# Patient Record
Sex: MALE | Race: WHITE | NOT HISPANIC OR LATINO | URBAN - METROPOLITAN AREA
[De-identification: names, ages, dates, MRNs, and addresses within clinical notes are randomized per-mention and may not be internally consistent; named-entity substitution may affect disease eponyms.]

---

## 2017-09-20 ENCOUNTER — IMPORTED ENCOUNTER (OUTPATIENT)
Dept: URBAN - METROPOLITAN AREA CLINIC 43 | Facility: CLINIC | Age: 72
End: 2017-09-20

## 2017-09-20 PROBLEM — H16.223: Noted: 2017-09-20

## 2017-09-20 PROBLEM — H43.22: Noted: 2017-09-20

## 2017-09-20 PROBLEM — H25.13: Noted: 2017-09-20

## 2017-09-20 PROBLEM — H43.812: Noted: 2017-09-20

## 2017-11-09 ENCOUNTER — IMPORTED ENCOUNTER (OUTPATIENT)
Dept: URBAN - METROPOLITAN AREA CLINIC 43 | Facility: CLINIC | Age: 72
End: 2017-11-09

## 2017-11-09 PROBLEM — H25.13: Noted: 2017-11-09

## 2019-02-27 ENCOUNTER — IMPORTED ENCOUNTER (OUTPATIENT)
Dept: URBAN - METROPOLITAN AREA CLINIC 43 | Facility: CLINIC | Age: 74
End: 2019-02-27

## 2019-02-27 PROBLEM — H25.13: Noted: 2019-02-27

## 2019-02-27 PROBLEM — H35.363: Noted: 2019-02-27

## 2019-02-27 PROBLEM — H25.013: Noted: 2019-02-27

## 2019-02-27 PROBLEM — H43.22: Noted: 2019-02-27

## 2020-04-19 ASSESSMENT — KERATOMETRY
OS_K1POWER_DIOPTERS: 43
OD_K1POWER_DIOPTERS: 43
OS_K1POWER_DIOPTERS: 43.5
OD_AXISANGLE_DEGREES: 180
OD_K1POWER_DIOPTERS: 42.75
OS_AXISANGLE_DEGREES: 170
OS_K2POWER_DIOPTERS: 42.75
OD_K2POWER_DIOPTERS: 42.75
OS_K2POWER_DIOPTERS: 42.75
OD_AXISANGLE_DEGREES: 180
OS_AXISANGLE2_DEGREES: 90
OD_AXISANGLE2_DEGREES: 90
OD_AXISANGLE2_DEGREES: 90
OD_K2POWER_DIOPTERS: 42.75
OS_AXISANGLE2_DEGREES: 80
OS_AXISANGLE_DEGREES: 180

## 2020-04-19 ASSESSMENT — VISUAL ACUITY
OD_CC: J2
OD_PH: 20/25-1
OS_OTHER: 20/60.
OD_OTHER: 20/70-.
OS_CC: 20/20-1
OD_CC: 20/25
OS_CC: 20/25 +2
OD_SC: 20/60
OD_SC: 20/60-1
OS_SC: 20/60 +2
OS_CC: J1
OD_OTHER: 20/300.
OD_CC: 20/25
OS_OTHER: <20/400.
OD_OTHER: 20/70.
OD_SC: J16-1
OS_SC: 20/60-2
OS_SC: J10-1
OS_OTHER: 20/50-.
OS_SC: 20/60-1
OD_SC: 20/70

## 2020-04-19 ASSESSMENT — TONOMETRY
OD_IOP_MMHG: 11.0
OD_IOP_MMHG: 12.0
OS_IOP_MMHG: 12.0
OD_IOP_MMHG: 12.0
OS_IOP_MMHG: 11.0
OS_IOP_MMHG: 12.0

## 2021-04-16 ENCOUNTER — LAB OUTSIDE AN ENCOUNTER (OUTPATIENT)
Dept: URBAN - METROPOLITAN AREA CLINIC 68 | Facility: CLINIC | Age: 76
End: 2021-04-16

## 2021-04-19 ENCOUNTER — LAB OUTSIDE AN ENCOUNTER (OUTPATIENT)
Dept: URBAN - METROPOLITAN AREA CLINIC 68 | Facility: CLINIC | Age: 76
End: 2021-04-19

## 2021-04-21 NOTE — PATIENT DISCUSSION
Monitor for changes. Advised Pt of condition of cataract and option of CE to improve visual function once becomes more bothersome. Discussed symptoms of worsening and becoming more bothersome. Pt to call if vision changes or increased symptoms.

## 2021-04-21 NOTE — PATIENT DISCUSSION
Monitor for changes. Advised Pt of condition and option of Yag Cap once becomes more bothered. Discussed symptoms of worsening and becoming more bothersome. Pt to call if vision changes or increased symptoms.

## 2021-04-26 ENCOUNTER — TELEPHONE ENCOUNTER (OUTPATIENT)
Dept: URBAN - METROPOLITAN AREA CLINIC 68 | Facility: CLINIC | Age: 76
End: 2021-04-26

## 2021-04-27 ENCOUNTER — OFFICE VISIT (OUTPATIENT)
Dept: URBAN - METROPOLITAN AREA CLINIC 68 | Facility: CLINIC | Age: 76
End: 2021-04-27

## 2021-04-27 ENCOUNTER — TELEPHONE ENCOUNTER (OUTPATIENT)
Dept: URBAN - METROPOLITAN AREA CLINIC 68 | Facility: CLINIC | Age: 76
End: 2021-04-27

## 2021-05-17 ENCOUNTER — OFFICE VISIT (OUTPATIENT)
Dept: URBAN - METROPOLITAN AREA CLINIC 68 | Facility: CLINIC | Age: 76
End: 2021-05-17

## 2022-06-04 ENCOUNTER — TELEPHONE ENCOUNTER (OUTPATIENT)
Dept: URBAN - METROPOLITAN AREA CLINIC 68 | Facility: CLINIC | Age: 77
End: 2022-06-04

## 2022-06-04 RX ORDER — POLYETHYLENE GLYOCOL 3350, SODIUM CHLORIDE, SODIUM BICARBONATE AND POTASSIUM CHLORIDE 420; 11.2; 5.72; 1.48 G/4L; G/4L; G/4L; G/4L
POWDER, FOR SOLUTION NASOGASTRIC; ORAL AS DIRECTED
Qty: 1 | Refills: 0 | OUTPATIENT
Start: 2020-05-12 | End: 2020-05-13

## 2022-06-05 ENCOUNTER — TELEPHONE ENCOUNTER (OUTPATIENT)
Dept: URBAN - METROPOLITAN AREA CLINIC 68 | Facility: CLINIC | Age: 77
End: 2022-06-05

## 2022-06-05 RX ORDER — LOSARTAN POTASSIUM 25 MG/1
LOSARTAN POTASSIUM( 25MG ORAL 1 DAILY ) ACTIVE -HX ENTRY TABLET ORAL DAILY
Status: ACTIVE | COMMUNITY
Start: 2021-04-27

## 2022-06-05 RX ORDER — PREGABALIN 150 MG/1
PREGABALIN( 150MG ORAL 1 DAILY ) ACTIVE -HX ENTRY CAPSULE ORAL DAILY
Status: ACTIVE | COMMUNITY
Start: 2021-04-27

## 2022-06-05 RX ORDER — GABAPENTIN 300 MG/1
GABAPENTIN( 300MG ORAL 1 DAILY AS NEEDED ) ACTIVE -HX ENTRY CAPSULE ORAL
Status: ACTIVE | COMMUNITY
Start: 2021-04-27

## 2022-06-05 RX ORDER — ROSUVASTATIN CALCIUM 20 MG/1
ROSUVASTATIN CALCIUM( 20MG ORAL 1 DAILY ) ACTIVE -HX ENTRY TABLET, FILM COATED ORAL DAILY
Status: ACTIVE | COMMUNITY
Start: 2021-04-27

## 2022-06-05 RX ORDER — APIXABAN 5 MG/1
ELIQUIS( 5MG ORAL 1 TWICE A DAY ) ACTIVE -HX ENTRY TABLET, FILM COATED ORAL TWICE A DAY
Status: ACTIVE | COMMUNITY
Start: 2021-04-27

## 2022-06-05 RX ORDER — ASPIRIN 325 MG/1
ASPIRIN( 325MG ORAL 1 DAILY ) ACTIVE -HX ENTRY TABLET ORAL DAILY
Status: ACTIVE | COMMUNITY
Start: 2021-04-27

## 2022-06-05 RX ORDER — CARVEDILOL 25 MG/1
CARVEDILOL( 25MG ORAL 1 TWICE A DAY ) ACTIVE -HX ENTRY TABLET, FILM COATED ORAL TWICE A DAY
Status: ACTIVE | COMMUNITY
Start: 2021-04-27

## 2022-06-05 RX ORDER — VARDENAFIL HYDROCHLORIDE 20 MG/1
VARDENAFIL HCL( 20MG ORAL 1 DAILY ) ACTIVE -HX ENTRY TABLET, FILM COATED ORAL DAILY
Status: ACTIVE | COMMUNITY
Start: 2021-04-27

## 2022-06-25 ENCOUNTER — TELEPHONE ENCOUNTER (OUTPATIENT)
Age: 77
End: 2022-06-25

## 2022-06-26 ENCOUNTER — TELEPHONE ENCOUNTER (OUTPATIENT)
Age: 77
End: 2022-06-26

## 2022-06-26 RX ORDER — LOSARTAN POTASSIUM 25 MG/1
LOSARTAN POTASSIUM( 25MG ORAL 1 DAILY ) ACTIVE -HX ENTRY TABLET, FILM COATED ORAL DAILY
Status: ACTIVE | COMMUNITY
Start: 2021-04-27

## 2022-06-26 RX ORDER — CARVEDILOL 25 MG/1
CARVEDILOL( 25MG ORAL 1 TWICE A DAY ) ACTIVE -HX ENTRY TABLET, FILM COATED ORAL TWICE A DAY
Status: ACTIVE | COMMUNITY
Start: 2021-04-27

## 2022-06-26 RX ORDER — VARDENAFIL HYDROCHLORIDE 20 MG/1
VARDENAFIL HCL( 20MG ORAL 1 DAILY ) ACTIVE -HX ENTRY TABLET, FILM COATED ORAL DAILY
Status: ACTIVE | COMMUNITY
Start: 2021-04-27

## 2022-06-26 RX ORDER — ROSUVASTATIN CALCIUM 20 MG/1
ROSUVASTATIN CALCIUM( 20MG ORAL 1 DAILY ) ACTIVE -HX ENTRY TABLET, FILM COATED ORAL DAILY
Status: ACTIVE | COMMUNITY
Start: 2021-04-27

## 2022-06-26 RX ORDER — APIXABAN 5 MG/1
ELIQUIS( 5MG ORAL 1 TWICE A DAY ) ACTIVE -HX ENTRY TABLET, FILM COATED ORAL TWICE A DAY
Status: ACTIVE | COMMUNITY
Start: 2021-04-27

## 2022-06-26 RX ORDER — GABAPENTIN 300 MG/1
GABAPENTIN( 300MG ORAL 1 DAILY AS NEEDED ) ACTIVE -HX ENTRY CAPSULE ORAL
Status: ACTIVE | COMMUNITY
Start: 2021-04-27

## 2022-06-26 RX ORDER — ASPIRIN 325 MG/1
ASPIRIN( 325MG ORAL 1 DAILY ) ACTIVE -HX ENTRY TABLET ORAL DAILY
Status: ACTIVE | COMMUNITY
Start: 2021-04-27

## 2022-06-26 RX ORDER — PREGABALIN 150 MG/1
PREGABALIN( 150MG ORAL 1 DAILY ) ACTIVE -HX ENTRY CAPSULE ORAL DAILY
Status: ACTIVE | COMMUNITY
Start: 2021-04-27

## 2024-07-11 ENCOUNTER — NEW PATIENT (OUTPATIENT)
Dept: URBAN - METROPOLITAN AREA CLINIC 32 | Facility: CLINIC | Age: 79
End: 2024-07-11

## 2024-07-11 DIAGNOSIS — H35.3132: ICD-10-CM

## 2024-07-11 DIAGNOSIS — H43.22: ICD-10-CM

## 2024-07-11 DIAGNOSIS — H25.13: ICD-10-CM

## 2024-07-11 DIAGNOSIS — H04.123: ICD-10-CM

## 2024-07-11 DIAGNOSIS — H35.033: ICD-10-CM

## 2024-07-11 DIAGNOSIS — H35.351: ICD-10-CM

## 2024-07-11 PROCEDURE — 99204 OFFICE O/P NEW MOD 45 MIN: CPT

## 2024-07-11 PROCEDURE — 92134 CPTRZ OPH DX IMG PST SGM RTA: CPT

## 2024-07-11 PROCEDURE — 92015 DETERMINE REFRACTIVE STATE: CPT

## 2024-07-11 ASSESSMENT — KERATOMETRY
OS_AXISANGLE2_DEGREES: 125
OD_AXISANGLE2_DEGREES: 95
OD_K2POWER_DIOPTERS: 42.75
OD_AXISANGLE_DEGREES: 5
OD_K1POWER_DIOPTERS: 43.25
OS_K1POWER_DIOPTERS: 43.25
OS_AXISANGLE_DEGREES: 35
OS_K2POWER_DIOPTERS: 42.75

## 2024-07-11 ASSESSMENT — VISUAL ACUITY
OS_CC: J2
OS_CC: 20/40
OD_GLARE: 20/200
OD_SC: J16
OS_GLARE: 20/200
OD_CC: J3
OD_CC: 20/60
OS_SC: J16
OS_SC: 20/70
OD_SC: 20/70

## 2024-07-11 ASSESSMENT — TONOMETRY
OS_IOP_MMHG: 11
OD_IOP_MMHG: 10

## 2024-08-19 ENCOUNTER — NEW PATIENT (OUTPATIENT)
Dept: URBAN - METROPOLITAN AREA CLINIC 33 | Facility: CLINIC | Age: 79
End: 2024-08-19

## 2024-08-19 VITALS
HEIGHT: 70 IN | WEIGHT: 290 LBS | HEART RATE: 70 BPM | SYSTOLIC BLOOD PRESSURE: 123 MMHG | DIASTOLIC BLOOD PRESSURE: 75 MMHG | BODY MASS INDEX: 41.52 KG/M2

## 2024-08-19 DIAGNOSIS — H25.13: ICD-10-CM

## 2024-08-19 DIAGNOSIS — H04.123: ICD-10-CM

## 2024-08-19 DIAGNOSIS — H35.351: ICD-10-CM

## 2024-08-19 DIAGNOSIS — H02.831: ICD-10-CM

## 2024-08-19 DIAGNOSIS — H02.834: ICD-10-CM

## 2024-08-19 DIAGNOSIS — H35.61: ICD-10-CM

## 2024-08-19 DIAGNOSIS — H35.3122: ICD-10-CM

## 2024-08-19 DIAGNOSIS — H35.3211: ICD-10-CM

## 2024-08-19 DIAGNOSIS — H35.033: ICD-10-CM

## 2024-08-19 PROCEDURE — 92134 CPTRZ OPH DX IMG PST SGM RTA: CPT | Mod: 59

## 2024-08-19 PROCEDURE — 92250 FUNDUS PHOTOGRAPHY W/I&R: CPT

## 2024-08-19 PROCEDURE — 67028 INJECTION EYE DRUG: CPT

## 2024-08-19 PROCEDURE — 99204 OFFICE O/P NEW MOD 45 MIN: CPT | Mod: 25

## 2024-08-19 ASSESSMENT — VISUAL ACUITY
OD_PH: 20/40-2
OD_SC: 20/50-1
OD_CC: 20/50+2
OS_SC: 20/30

## 2024-08-19 ASSESSMENT — TONOMETRY
OS_IOP_MMHG: 14
OD_IOP_MMHG: 13

## 2024-09-25 ENCOUNTER — CLINIC PROCEDURE ONLY (OUTPATIENT)
Dept: URBAN - METROPOLITAN AREA CLINIC 33 | Facility: CLINIC | Age: 79
End: 2024-09-25

## 2024-09-25 DIAGNOSIS — H35.3211: ICD-10-CM

## 2024-09-25 DIAGNOSIS — H35.3122: ICD-10-CM

## 2024-09-25 PROCEDURE — 92134 CPTRZ OPH DX IMG PST SGM RTA: CPT

## 2024-09-25 PROCEDURE — 67028 INJECTION EYE DRUG: CPT

## 2024-09-25 PROCEDURE — 92250 FUNDUS PHOTOGRAPHY W/I&R: CPT

## 2024-10-29 ENCOUNTER — CLINIC PROCEDURE ONLY (OUTPATIENT)
Dept: URBAN - METROPOLITAN AREA CLINIC 33 | Facility: CLINIC | Age: 79
End: 2024-10-29

## 2024-10-29 DIAGNOSIS — H35.3211: ICD-10-CM

## 2024-10-29 DIAGNOSIS — H35.3122: ICD-10-CM

## 2024-10-29 PROCEDURE — 92250 FUNDUS PHOTOGRAPHY W/I&R: CPT

## 2024-10-29 PROCEDURE — 67028 INJECTION EYE DRUG: CPT

## 2024-10-29 PROCEDURE — 92134 CPTRZ OPH DX IMG PST SGM RTA: CPT

## 2024-11-25 ENCOUNTER — CONSULTATION/EVALUATION (OUTPATIENT)
Dept: URBAN - METROPOLITAN AREA CLINIC 32 | Facility: CLINIC | Age: 79
End: 2024-11-25

## 2024-11-25 DIAGNOSIS — H35.033: ICD-10-CM

## 2024-11-25 DIAGNOSIS — H25.13: ICD-10-CM

## 2024-11-25 DIAGNOSIS — H43.22: ICD-10-CM

## 2024-11-25 DIAGNOSIS — H35.3122: ICD-10-CM

## 2024-11-25 DIAGNOSIS — H35.3211: ICD-10-CM

## 2024-11-25 DIAGNOSIS — H04.123: ICD-10-CM

## 2024-11-25 DIAGNOSIS — H35.351: ICD-10-CM

## 2024-11-25 PROCEDURE — 92014 COMPRE OPH EXAM EST PT 1/>: CPT

## 2024-12-10 ENCOUNTER — CLINIC PROCEDURE ONLY (OUTPATIENT)
Age: 79
End: 2024-12-10

## 2024-12-10 DIAGNOSIS — H35.3122: ICD-10-CM

## 2024-12-10 DIAGNOSIS — H35.3211: ICD-10-CM

## 2024-12-10 PROCEDURE — 67028 INJECTION EYE DRUG: CPT

## 2024-12-10 PROCEDURE — 92134 CPTRZ OPH DX IMG PST SGM RTA: CPT

## 2024-12-10 PROCEDURE — 92250 FUNDUS PHOTOGRAPHY W/I&R: CPT | Mod: 59

## 2025-01-28 ENCOUNTER — CLINIC PROCEDURE ONLY (OUTPATIENT)
Age: 80
End: 2025-01-28

## 2025-01-28 DIAGNOSIS — H35.3211: ICD-10-CM

## 2025-01-28 DIAGNOSIS — H35.3122: ICD-10-CM

## 2025-01-28 PROCEDURE — 67028 INJECTION EYE DRUG: CPT

## 2025-01-28 PROCEDURE — 92250 FUNDUS PHOTOGRAPHY W/I&R: CPT | Mod: 59

## 2025-01-28 PROCEDURE — 92134 CPTRZ OPH DX IMG PST SGM RTA: CPT

## 2025-03-11 ENCOUNTER — CLINIC PROCEDURE ONLY (OUTPATIENT)
Age: 80
End: 2025-03-11

## 2025-03-11 DIAGNOSIS — H35.3122: ICD-10-CM

## 2025-03-11 DIAGNOSIS — H35.3211: ICD-10-CM

## 2025-03-11 PROCEDURE — 67028 INJECTION EYE DRUG: CPT

## 2025-03-11 PROCEDURE — J2777PFS VABYSMO PFS: Mod: JZ

## 2025-03-11 PROCEDURE — 92250 FUNDUS PHOTOGRAPHY W/I&R: CPT

## 2025-03-11 PROCEDURE — 92134 CPTRZ OPH DX IMG PST SGM RTA: CPT

## 2025-03-11 RX ORDER — NEOMYCIN SULFATE, POLYMYXIN B SULFATE AND DEXAMETHASONE 3.5; 10000; 1 MG/G; [USP'U]/G; MG/G: OINTMENT OPHTHALMIC

## 2025-04-29 ENCOUNTER — CLINIC PROCEDURE ONLY (OUTPATIENT)
Age: 80
End: 2025-04-29

## 2025-04-29 DIAGNOSIS — H35.3211: ICD-10-CM

## 2025-04-29 DIAGNOSIS — H35.3122: ICD-10-CM

## 2025-04-29 PROCEDURE — 67028 INJECTION EYE DRUG: CPT

## 2025-04-29 PROCEDURE — J2777PFS VABYSMO PFS: Mod: JZ,RT

## 2025-04-29 PROCEDURE — 92134 CPTRZ OPH DX IMG PST SGM RTA: CPT

## 2025-04-29 PROCEDURE — 92250 FUNDUS PHOTOGRAPHY W/I&R: CPT

## 2025-06-24 ENCOUNTER — FOLLOW UP (OUTPATIENT)
Age: 80
End: 2025-06-24

## 2025-06-24 VITALS — WEIGHT: 230 LBS | BODY MASS INDEX: 32.93 KG/M2 | HEIGHT: 70 IN

## 2025-06-24 DIAGNOSIS — H35.61: ICD-10-CM

## 2025-06-24 DIAGNOSIS — H35.3122: ICD-10-CM

## 2025-06-24 DIAGNOSIS — H00.013: ICD-10-CM

## 2025-06-24 DIAGNOSIS — H35.3211: ICD-10-CM

## 2025-06-24 DIAGNOSIS — H35.033: ICD-10-CM

## 2025-06-24 DIAGNOSIS — H35.351: ICD-10-CM

## 2025-06-24 DIAGNOSIS — H02.834: ICD-10-CM

## 2025-06-24 DIAGNOSIS — H04.123: ICD-10-CM

## 2025-06-24 DIAGNOSIS — H02.831: ICD-10-CM

## 2025-06-24 DIAGNOSIS — H25.13: ICD-10-CM

## 2025-06-24 PROCEDURE — 92134 CPTRZ OPH DX IMG PST SGM RTA: CPT

## 2025-06-24 PROCEDURE — 67028 INJECTION EYE DRUG: CPT

## 2025-06-24 PROCEDURE — 92014 COMPRE OPH EXAM EST PT 1/>: CPT | Mod: 25

## 2025-06-24 PROCEDURE — 92250 FUNDUS PHOTOGRAPHY W/I&R: CPT

## 2025-06-24 PROCEDURE — J2777PFS VABYSMO PFS: Mod: JZ
